# Patient Record
Sex: MALE | Race: WHITE | Employment: UNEMPLOYED | URBAN - METROPOLITAN AREA
[De-identification: names, ages, dates, MRNs, and addresses within clinical notes are randomized per-mention and may not be internally consistent; named-entity substitution may affect disease eponyms.]

---

## 2020-07-07 ENCOUNTER — HOSPITAL ENCOUNTER (OUTPATIENT)
Age: 30
Discharge: HOME | End: 2020-07-07
Payer: COMMERCIAL

## 2020-07-07 VITALS
DIASTOLIC BLOOD PRESSURE: 67 MMHG | WEIGHT: 190 LBS | HEART RATE: 109 BPM | OXYGEN SATURATION: 100 % | RESPIRATION RATE: 16 BRPM | SYSTOLIC BLOOD PRESSURE: 132 MMHG | TEMPERATURE: 101 F

## 2020-07-07 DIAGNOSIS — R50.9 FEVER, UNSPECIFIED FEVER CAUSE: Primary | ICD-10-CM

## 2020-07-07 PROCEDURE — 99203 OFFICE O/P NEW LOW 30 MIN: CPT | Performed by: NURSE PRACTITIONER

## 2020-07-07 PROCEDURE — U0003 INFECTIOUS AGENT DETECTION BY NUCLEIC ACID (DNA OR RNA); SEVERE ACUTE RESPIRATORY SYNDROME CORONAVIRUS 2 (SARS-COV-2) (CORONAVIRUS DISEASE [COVID-19]), AMPLIFIED PROBE TECHNIQUE, MAKING USE OF HIGH THROUGHPUT TECHNOLOGIES AS DESCRIBED BY CMS-2020-01-R: HCPCS | Performed by: NURSE PRACTITIONER

## 2020-07-07 NOTE — ED PROVIDER NOTES
Patient Seen in: [id number] [facility] [place]      History   Patient presents with:  Fever    Stated Complaint: Covid Testing-chills    -year-old male who presents to the immediate care with complaints of fever of a T-max of 100.8 at home.   On and off /67   Pulse 109   Resp 16   Temp (!) 101.3 °F (38.5 °C)   Temp src Oral   SpO2 100 %   O2 Device None (Room air)       Current:/67   Pulse 109   Temp (!) 101.3 °F (38.5 °C) (Oral)   Resp 16   Wt 86.2 kg   SpO2 100%         Physical Exam    [place] [id number] [phone]                Medications Prescribed:  There are no discharge medications for this patient.

## 2020-07-09 LAB — SARS-COV-2 RNA RESP QL NAA+PROBE: NOT DETECTED

## (undated) NOTE — LETTER
Date & Time: 7/7/2020, 12:20 PM  Patient: Ulices Ramírez  Encounter Provider(s):    MANOJ Christina       To Whom It May Concern:    Aryan Clinton Smith was seen and treated in our department on 7/7/2020.  He should not return to work until 7/10/20

## (undated) NOTE — LETTER
[address] [place] [address] [address] [place] [id number]  Dept: [phone]  Dept Fax: [phone]         July 10, 2020    Patient: Robinson Chávez   YOB: 1990   Date of Visit: 7/7/2020       To Whom It May Concern:    Fernando Mendiola